# Patient Record
Sex: FEMALE | Race: WHITE
[De-identification: names, ages, dates, MRNs, and addresses within clinical notes are randomized per-mention and may not be internally consistent; named-entity substitution may affect disease eponyms.]

---

## 2018-06-14 ENCOUNTER — HOSPITAL ENCOUNTER (OUTPATIENT)
Dept: HOSPITAL 47 - RADBDWWP | Age: 78
Discharge: HOME | End: 2018-06-14
Payer: MEDICARE

## 2018-06-14 DIAGNOSIS — Z78.0: Primary | ICD-10-CM

## 2018-06-14 PROCEDURE — 77080 DXA BONE DENSITY AXIAL: CPT

## 2018-06-14 NOTE — BD
EXAMINATION TYPE: Axial Bone Density

 

DATE OF EXAM: 2018

 

COMPARISON: NONE

 

CLINICAL HISTORY: 77 YR OLD FEMALE....ICD-10 CODE:  Z78.0 POST MENOPAUSAL STATE W/O HRT

 

 

Height:  59.3

Weight:  138

 

FRAX RISK QUESTIONS:

  Current Tobacco Use: QUIT 22 YRS AGO

STEROIDS 

 

RISK FACTORS 

HISTORY OF: 

Family History of Osteoporosis: YES, SISTER AND MOTHER, NO FX HIPS

Active: BEST SHE CA, LUNG PROBLEMS

Diet low in dairy products/other sources of calcium:  NO

Postmenopausal woman: YES, IN LATE 40'S

Take estrogen and/or progesterone medications: YES, PREMARIN AND PROVERA

How lon YRS

Lost more than 2 inches in height since high school: YES

 

MEDICATIONS: 

Prednisone or other steroids: UPDRAFT MACHINE WITH ALBUTEROL FOR LUNGS

How Long: FOR 3 YRS

Thyroid Medications:  YES, SYNTHROID, 20-30 YRS

Additional Medications: HX OF RADIATION AND CHEMO, REFLUX MEDS,    

Additional History: LUNG CA, 2015,  

 

EXAM MEASUREMENTS: 

Bone mineral densitometry was performed using the [employer] System.

Bone mineral density as measured about the Lumbar spine is:  

----- L1-L4(G/cm2): 1.167

T Score Values are as follows:

----- L1: -0.3

----- L2:  -0.1

----- L3:  0.3

----- L4:  -0.4

----- L1-L4:  -0.1

Bone mineral density     FIRST BONE DENSITY STUDY AT [facility]

 

Bone mineral density about the R hip (g/cm2): 0.971

Bone mineral density about the L hip (g/cm2):  1.017

T Score values are as follows:

-----R Neck:  -1.0

-----L Neck:  -0.4

-----R Total:  -0.3

-----L Total:  0.1

Bone mineral density    FIRST STUDY AT [facility] 

 

FRAX%s:    THERE IS A 17.1% CHANCE OF A MAJOR OSTEOPOROTIC FX AND A 3.7% FOR HIP FX.....PROBABILITY O
F FX

IN 10 YRS TIME

 

IMPRESSION:

Normal (Values between +1 and -1 indicate normal bone mass).  Consider repeating this study in 5 year
s or sooner if there is some new clinical indication.

 

NOTE:  T-SCORE=SD OF THE YOUNG ADULT MEAN.

## 2020-06-15 ENCOUNTER — HOSPITAL ENCOUNTER (EMERGENCY)
Dept: HOSPITAL 47 - EC | Age: 80
Discharge: HOME | End: 2020-06-15
Payer: MEDICARE

## 2020-06-15 VITALS — DIASTOLIC BLOOD PRESSURE: 87 MMHG | SYSTOLIC BLOOD PRESSURE: 160 MMHG | HEART RATE: 70 BPM

## 2020-06-15 VITALS — TEMPERATURE: 98.2 F | RESPIRATION RATE: 18 BRPM

## 2020-06-15 DIAGNOSIS — Z79.890: ICD-10-CM

## 2020-06-15 DIAGNOSIS — Z85.118: ICD-10-CM

## 2020-06-15 DIAGNOSIS — Z79.899: ICD-10-CM
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
## 2020-06-15 DIAGNOSIS — K21.9: ICD-10-CM

## 2020-06-15 DIAGNOSIS — S01.01XA: Primary | ICD-10-CM

## 2020-06-15 DIAGNOSIS — E07.9: ICD-10-CM

## 2020-06-15 DIAGNOSIS — Z87.891: ICD-10-CM

## 2020-06-15 PROCEDURE — 99283 EMERGENCY DEPT VISIT LOW MDM: CPT

## 2020-06-15 PROCEDURE — 12002 RPR S/N/AX/GEN/TRNK2.6-7.5CM: CPT

## 2020-06-15 PROCEDURE — 72125 CT NECK SPINE W/O DYE: CPT

## 2020-06-15 PROCEDURE — 70450 CT HEAD/BRAIN W/O DYE: CPT

## 2020-06-15 NOTE — CT
EXAMINATION TYPE: CT brain cspine wo con

 

DATE OF EXAM: 6/15/2020

 

COMPARISON: Chest CT Kena 15, 2015. Two-view chest x-ray January 3, 2016.

 

HISTORY: Fall, head laceration injury with headache and neck pain. History of lung cancer.

 

CT DLP: 1255.9 mGycm. Automated Exposure Control for Dose Reduction was Utilized.

 

 

TECHNIQUE: CT scan of the head and cervical spine are performed without contrast.

 

FINDINGS:   There is no acute intracranial hemorrhage or midline shift identified. Diffuse ventricula
r and sulcal prominence consistent with mild-to-moderate generalized atrophy. Low-attenuation in the 
deep white matter consistent with mild-to-moderate chronic small vessel ischemic change. The calvariu
m is intact. The globes are intact and the visualized sinuses are clear. Nasal septum deviated to rig
ht of midline.

 

Cervical spine is visualized in its entirety from C1 through upper thoracic levels and demonstrates s
atisfactory alignment without evidence of acute fracture or dislocation.  Prevertebral soft tissue ap
pears within normal limits.  The C1-C2 articulation is within normal limits on the coronal images.  V
ertebral body heights are maintained. Moderate to severe disc space narrowing and spurring C6-C7 leve
l. Mild to moderate anterior spurring C7-T1 level and C5-C6 level. 

 

Posterior disc herniations efface the anterior thecal sac at C5-C6 spur disc complex at C6-C7 level o
n axial images. Mild calcified plaque left carotid bulb level is present. Thyroid gland is small in s
ize or atrophic. Left lung apex shows increasing calcification and moderate pleural/parenchymal scarr
ing. There is fluid and soft tissue filling the right lung apex more prominent from 2015 and 2016 addi
dies. Correlate clinically.

 

IMPRESSION:

1. There is no acute fracture or dislocation evident in the cervical spine.

2. No acute intracranial hemorrhage or midline shift is seen.

 

Other findings as noted above.

## 2020-06-15 NOTE — ED
Head Injury HPI





- General


Chief complaint: Head Injury


Stated complaint: fall, head lac


Time Seen by Provider: 06/15/20 10:24


Source: patient


Mode of arrival: ambulatory


Limitations: no limitations





- History of Present Illness


Initial comments: 


79-year-old female presenting today for chief complaint of fall.  Patient states

that she was on a stool one step up attempting to grab something off of a shelf 

when she had her hand slipped and she fell backwards onto her bottom.  Patient 

states she has no symptoms back pain very mild.  She states she fell backwards 

and hit her head after she landed on her butt striking an object and was more 

concerned about the laceration.  She states her head was bleeding and cannot see

the area.  Patient states her tetanus up-to-date.  She denies any headache 

nausea vomiting visual changes weakness of the upper or lower extremities or 

sensation deficits of the upper or lower extremities she states she is able to 

walk without difficulty denies any hip or pelvic pain denies abdominal pain 

chest pain or pain with deep inspiration.  Patient is no additional complaints 

denies any use of anticoagulation therapy or loss of consciousness.  Patient 

appears on arrival her distress.  Bleeding controlled








- Related Data


                                Home Medications











 Medication  Instructions  Recorded  Confirmed


 


Levothyroxine Sodium [Synthroid] 137 mcg PO DAILY 03/28/15 06/15/20


 


Omeprazole [PriLOSEC] 20 mg PO BID 05/13/15 06/15/20


 


Estrogens, Conjugated [Premarin] 0.3 mg PO Q5D 11/23/15 06/15/20


 


Medroxyprogesterone Acetate 1.25 mg PO Q5D 11/23/15 06/15/20





[Provera]   


 


Cholecalciferol [Vitamin D3 (25 1,000 unit PO DAILY 06/15/20 06/15/20





Mcg = 1000 Iu)]   


 


Clobetasol Propionate/Emoll 1 applic TOPICAL DAILY PRN 06/15/20 06/15/20





[Clobetasol Emulsion 0.05% Foam]   











Allergies/Adverse reactions: 


                                    Allergies











Allergy/AdvReac Type Severity Reaction Status Date / Time


 


No Known Allergies Allergy   Verified 06/15/20 10:35














Review of Systems


ROS Statement: 


Those systems with pertinent positive or pertinent negative responses have been 

documented in the HPI.





ROS Other: All systems not noted in ROS Statement are negative.





Past Medical History


Past Medical History: Coronary Artery Disease (CAD), Cancer, COPD, GERD/Reflux, 

Hyperlipidemia, Osteoarthritis (OA), Pneumonia, Thyroid Disorder


Additional Past Medical History / Comment(s): stage IIB small cell lung cancer, 

bladder atony, had chemo last one 4/19/15, finished radiation 2015


History of Any Multi-Drug Resistant Organisms: None Reported


Past Surgical History: Adenoidectomy, Cholecystectomy, Tonsillectomy


Additional Past Surgical History / Comment(s): cataract surgery, transbronchial 

biopsy, VATS, port A- cath, colonoscopy. has mediport on right side of chest-

REMOVED 2015, removal of RUL and removal of lymph nodes right side 5/27/15


Past Anesthesia/Blood Transfusion Reactions: No Reported Reaction


Past Psychological History: No Psychological Hx Reported


Smoking Status: Former smoker


Past Alcohol Use History: Occasional


Past Drug Use History: None Reported





- Past Family History


  ** Mother


Family Medical History: Coronary Artery Disease (CAD), CVA/TIA, Myocardial 

Infarction (MI)


Additional Family Medical History / Comment(s): Mother  of heart attack at 

age 84





  ** Father


Family Medical History: Coronary Artery Disease (CAD), Myocardial Infarction 

(MI)


Additional Family Medical History / Comment(s): Father  of heart attack at 

age 86





  ** Brother(s)


Family Medical History: Myocardial Infarction (MI)


Additional Family Medical History / Comment(s): Brother  of heart attack at 

age 51





  ** Sister(s)


Family Medical History: Myocardial Infarction (MI)


Additional Family Medical History / Comment(s): Aunt with leukemia, maternal 

aunt with breast cancer





General Exam





- General Exam Comments


Initial Comments: 


General:  The patient is awake and alert, in no distress, and does not appear 

acutely ill. 


Eye:  +3 mm pupils are equal, round and reactive to light, extra-ocular 

movements are intact.  No nystagmus.  There is normal conjunctiva bilaterally.  

No signs of icterus.  


Ears, nose, mouth and throat:  There are moist mucous membranes and no oral 

lesions.  No raccoon or Ahumada sign no noted hematomas or abrasions of the 

parietal frontal temporal regions of the scalp there is a small hematoma of the 

occiptal region with what appears to be 2.5cm laceration horizontal lie  left-

sided, no bleeding


Neck:  The neck is supple, there is no tenderness or JVD.  No midline tenderness

to palpation the cervical spine full range of motion of cervical spine without 

pain.


Cardiovascular:  There is a regular rate and rhythm. No murmur, rub or gallop is

appreciated.


Respiratory:  Lungs are clear to auscultation, respirations are non-labored, 

breath sounds are equal.  No wheezes, stridor, rales, or rhonchi.


Gastrointestinal:  Soft, non-distended, non-tender abdomen without masses or 

organomegaly noted. There is no rebound or guarding present. 


Musculoskeletal:  No midline tenderness or pain to palpation of lumbar spine. 

Normal ROM, no tenderness of the LE b/l.  Strength 5/5 of the LE b/l. Sensation 

intact of the LE b/l. Radial pulses equal bilaterally 2+.  


Neurological:  A&O x 3. CN II-XII intact, There are no obvious motor or sensory 

deficits. Coordination appears grossly intact. Speech is normal.


Skin:  Skin is warm and dry and no rashes


Psychiatric:  Cooperative, appropriate mood & affect, normal judgment.  





Limitations: no limitations





Course


                                   Vital Signs











  06/15/20 06/15/20





  10:35 11:45


 


Temperature 98.2 F 


 


Pulse Rate 97 70


 


Respiratory 18 18





Rate  


 


Blood Pressure 167/87 160/87


 


O2 Sat by Pulse 97 98





Oximetry  














Procedures





- Laceration


  ** Laceration #1


Consent Obtained: verbal consent


Indication: laceration


Site: scalp


Size (cm): 3 (2.5 actual)


Description: linear


Depth: simple, single layer


Pre-repair: wound explored, deep structures intact


Type of Sutures: other (staple)


Size of Sutures: other (staple)


Number of Sutures: 5


Complications: pain


Patient Tolerated Procedure: well





Medical Decision Making





- Medical Decision Making


79-year-old female presenting today for follow-up.  On initial inspection there.

 The abrasion however on further evaluation after cleansing there was a 2.5 cm 

laceration repaired with staples.  Patient no midline neck pain no back pain to 

palpation on examination she states that she is really not concerned with the 

back more so the head.  Neurovascularly patient was intact. CT (-) for acute 

process. HX of lung cancer, some apices changes on CT picked up. recommended 

outpatient f/u for incidental findings including the atherosclerosis of the 

carotid bulbs. Patient is agreeable to discharge with return for staple removal 

in 10 days which was discussed, they know they are to return to ER for removal 

or PCP. Patient discharged appearing well. Dr. Contreras agreeable to care plan.








Disposition


Clinical Impression: 


 Head injury, Fall, Scalp abrasion, Hematoma, Laceration





Disposition: HOME SELF-CARE


Condition: Good


Instructions (If sedation given, give patient instructions):  Abrasion (ED), 

Staple Care (ED)


Additional Instructions: 


Please use medication as discussed.  Please follow-up with family doctor in the 

next 2 days, recommend obtaining the CT report to go over incidental findings 

and scheduling appropriate outpatient follow-up/studies.  Please return to 

emergency room if the symptoms increase or worsen or for any other concerns.


Is patient prescribed a controlled substance at d/c from ED?: No


Referrals: 


Junior Fairchild MD [Primary Care Provider] - 1-2 days


Time of Disposition: 11:13

## 2022-12-30 ENCOUNTER — HOSPITAL ENCOUNTER (OUTPATIENT)
Dept: HOSPITAL 47 - RADCTMAIN | Age: 82
Discharge: HOME | End: 2022-12-30
Attending: INTERNAL MEDICINE
Payer: MEDICARE

## 2022-12-30 DIAGNOSIS — J43.9: Primary | ICD-10-CM

## 2022-12-30 DIAGNOSIS — R91.1: ICD-10-CM

## 2022-12-30 LAB — BUN SERPL-SCNC: 15 MG/DL (ref 7–17)

## 2022-12-30 PROCEDURE — 84520 ASSAY OF UREA NITROGEN: CPT

## 2022-12-30 PROCEDURE — 71260 CT THORAX DX C+: CPT

## 2022-12-30 PROCEDURE — 36415 COLL VENOUS BLD VENIPUNCTURE: CPT

## 2022-12-30 PROCEDURE — 82565 ASSAY OF CREATININE: CPT

## 2022-12-30 NOTE — CT
Exam: CT Chest with contrast.

 

Date: 12/30/2022. 

 

Comparison: 6/15/2015.

 

History: History of lung cancer with chronic cough.

 

Technique: CT examination of the chest was performed following the intravenous administration of 70 m
L of Isovue-300.  Coronal and sagittal reformats were performed. CT dose lowering techniques were use
d, to include: automated exposure control, adjustment for patient size, and/or use of iterative recon
struction.

 

FINDINGS:

 

Mediastinum and Lian: There is no axillary, mediastinal or hilar lymphadenopathy.

 

Pleural and Pericardial spaces: There are no pleural or pericardial effusions.

 

Upper Abdomen: There is an unchanged small cyst within the left lobe of the liver. The gallbladder silva
rgically absent. The visualized upper abdomen otherwise appears unremarkable.

 

Cardiovascular: Is mild vascular calcination within the thoracic aorta without evidence of aneurysmal
 dilation or dissection. There are mild patchy coronary artery calcifications.

 

Pulmonary Artery: There are no central pulmonary arterial abnormalities.  The examination was not per
formed to evaluate for pulmonary embolism.

 

Lung Parenchyma and Airways: There is some consolidative changes seen within the right apical region 
likely postsurgical changes as well as some suture lines as this is likely related to surgical change
s and scarring. Residual disease would be difficult to entirely exclude. There are some linear bands 
of opacities otherwise seen within the right lung which likely related to scarring. There is some mil
d centrilobular emphysema seen throughout the left lung. There is a 1.6 cm nodule in the superior seg
ment of the left lower lobe abutting the pleural surface on series 4 image 19 which was not clearly p
resent on the previous examination that was available for comparison. The lungs otherwise appear fiona
r

 

Bones: Multiple rib abnormalities on the right side are likely related to prior surgical changes. No 
aggressive osseous lesions are seen.

 

IMPRESSION:

 

1. Surgical changes of right upper lobectomy as well as likely scarring and soft tissue density withi
n the right upper lobe along with some consolidative changes. Residual disease in this location very 
difficult to entirely exclude.

2. Irregular appearing nodule with the superior segment of the left lower lobe is of indeterminate et
iology. Malignancy and metastasis would be difficult to exclude. PET/CT would BE recommended for furt
her evaluation if this has not previously been worked up ORIF there are no more recent prior examinat
ions for comparison. The prior for comparison available is dated 6/15/2015.

3. Mild emphysema.

## 2023-07-18 ENCOUNTER — HOSPITAL ENCOUNTER (INPATIENT)
Dept: HOSPITAL 47 - EC | Age: 83
LOS: 1 days | Discharge: HOME | DRG: 192 | End: 2023-07-19
Payer: MEDICARE

## 2023-07-18 DIAGNOSIS — Z92.3: ICD-10-CM

## 2023-07-18 DIAGNOSIS — Z92.21: ICD-10-CM

## 2023-07-18 DIAGNOSIS — K21.9: ICD-10-CM

## 2023-07-18 DIAGNOSIS — Z85.118: ICD-10-CM

## 2023-07-18 DIAGNOSIS — I25.10: ICD-10-CM

## 2023-07-18 DIAGNOSIS — R79.89: ICD-10-CM

## 2023-07-18 DIAGNOSIS — N31.2: ICD-10-CM

## 2023-07-18 DIAGNOSIS — Z79.899: ICD-10-CM

## 2023-07-18 DIAGNOSIS — E03.9: ICD-10-CM

## 2023-07-18 DIAGNOSIS — Z90.2: ICD-10-CM

## 2023-07-18 DIAGNOSIS — E78.5: ICD-10-CM

## 2023-07-18 DIAGNOSIS — Z87.01: ICD-10-CM

## 2023-07-18 DIAGNOSIS — J44.1: Primary | ICD-10-CM

## 2023-07-18 DIAGNOSIS — Z82.49: ICD-10-CM

## 2023-07-18 DIAGNOSIS — Z79.890: ICD-10-CM

## 2023-07-18 DIAGNOSIS — M19.90: ICD-10-CM

## 2023-07-18 LAB
ALBUMIN SERPL-MCNC: 3.9 G/DL (ref 3.5–5)
ALP SERPL-CCNC: 63 U/L (ref 38–126)
ALT SERPL-CCNC: 17 U/L (ref 4–34)
ANION GAP SERPL CALC-SCNC: 9 MMOL/L
APTT BLD: 23.6 SEC (ref 22–30)
AST SERPL-CCNC: 37 U/L (ref 14–36)
BUN SERPL-SCNC: 14 MG/DL (ref 7–17)
CALCIUM SPEC-MCNC: 9.5 MG/DL (ref 8.4–10.2)
CELLS COUNTED: 100
CHLORIDE SERPL-SCNC: 104 MMOL/L (ref 98–107)
CO2 SERPL-SCNC: 23 MMOL/L (ref 22–30)
EOSINOPHIL # BLD MANUAL: 0.26 K/UL (ref 0–0.7)
ERYTHROCYTE [DISTWIDTH] IN BLOOD BY AUTOMATED COUNT: 4.54 M/UL (ref 3.8–5.4)
ERYTHROCYTE [DISTWIDTH] IN BLOOD: 13.1 % (ref 11.5–15.5)
GLUCOSE SERPL-MCNC: 133 MG/DL (ref 74–99)
HCT VFR BLD AUTO: 39.1 % (ref 34–46)
HGB BLD-MCNC: 13.3 GM/DL (ref 11.4–16)
INR PPP: 1 (ref ?–1.2)
LYMPHOCYTES # BLD MANUAL: 2.08 K/UL (ref 1–4.8)
MAGNESIUM SPEC-SCNC: 1.8 MG/DL (ref 1.6–2.3)
MCH RBC QN AUTO: 29.4 PG (ref 25–35)
MCHC RBC AUTO-ENTMCNC: 34.1 G/DL (ref 31–37)
MCV RBC AUTO: 86.1 FL (ref 80–100)
MONOCYTES # BLD MANUAL: 0.65 K/UL (ref 0–1)
NEUTROPHILS NFR BLD MANUAL: 54 %
NEUTS SEG # BLD MANUAL: 3.51 K/UL (ref 1.3–7.7)
PLATELET # BLD AUTO: 242 K/UL (ref 150–450)
POTASSIUM SERPL-SCNC: 4 MMOL/L (ref 3.5–5.1)
PROT SERPL-MCNC: 7.1 G/DL (ref 6.3–8.2)
PT BLD: 10.2 SEC (ref 9–12)
SODIUM SERPL-SCNC: 136 MMOL/L (ref 137–145)
WBC # BLD AUTO: 6.5 K/UL (ref 3.8–10.6)

## 2023-07-18 PROCEDURE — 84145 PROCALCITONIN (PCT): CPT

## 2023-07-18 PROCEDURE — 83880 ASSAY OF NATRIURETIC PEPTIDE: CPT

## 2023-07-18 PROCEDURE — 85025 COMPLETE CBC W/AUTO DIFF WBC: CPT

## 2023-07-18 PROCEDURE — 85610 PROTHROMBIN TIME: CPT

## 2023-07-18 PROCEDURE — 87040 BLOOD CULTURE FOR BACTERIA: CPT

## 2023-07-18 PROCEDURE — 94760 N-INVAS EAR/PLS OXIMETRY 1: CPT

## 2023-07-18 PROCEDURE — 96374 THER/PROPH/DIAG INJ IV PUSH: CPT

## 2023-07-18 PROCEDURE — 87449 NOS EACH ORGANISM AG IA: CPT

## 2023-07-18 PROCEDURE — 84484 ASSAY OF TROPONIN QUANT: CPT

## 2023-07-18 PROCEDURE — 83605 ASSAY OF LACTIC ACID: CPT

## 2023-07-18 PROCEDURE — 87205 SMEAR GRAM STAIN: CPT

## 2023-07-18 PROCEDURE — 71045 X-RAY EXAM CHEST 1 VIEW: CPT

## 2023-07-18 PROCEDURE — 83735 ASSAY OF MAGNESIUM: CPT

## 2023-07-18 PROCEDURE — 80053 COMPREHEN METABOLIC PANEL: CPT

## 2023-07-18 PROCEDURE — 93005 ELECTROCARDIOGRAM TRACING: CPT

## 2023-07-18 PROCEDURE — 99285 EMERGENCY DEPT VISIT HI MDM: CPT

## 2023-07-18 PROCEDURE — 85379 FIBRIN DEGRADATION QUANT: CPT

## 2023-07-18 PROCEDURE — 80048 BASIC METABOLIC PNL TOTAL CA: CPT

## 2023-07-18 PROCEDURE — 94640 AIRWAY INHALATION TREATMENT: CPT

## 2023-07-18 PROCEDURE — 85730 THROMBOPLASTIN TIME PARTIAL: CPT

## 2023-07-18 PROCEDURE — 87070 CULTURE OTHR SPECIMN AEROBIC: CPT

## 2023-07-18 PROCEDURE — 36415 COLL VENOUS BLD VENIPUNCTURE: CPT

## 2023-07-18 RX ADMIN — IPRATROPIUM BROMIDE AND ALBUTEROL SULFATE SCH ML: .5; 3 SOLUTION RESPIRATORY (INHALATION) at 22:45

## 2023-07-18 NOTE — ED
General Adult HPI





- General


Chief complaint: Shortness of Breath


Stated complaint: VIGNESH


Time Seen by Provider: 23 13:40


Source: patient, family, RN notes reviewed


Mode of arrival: wheelchair


Limitations: no limitations





- History of Present Illness


Initial comments: 





Patient is a pleasant 82-year-old female presenting to the emergency department 

with concerns with difficulty breathing.  Patient does have history of lung 

cancer with some chronic mild dyspnea.  Patient uses oxygen at night.  Patient 

states she started with increased dyspnea and wheezing the last couple hours.  

No chest pain.  No cough.  No fever.  Patient rarely wheezes and questions if 

she has a COPD history.  No leg pain or swelling.





- Related Data


                                Home Medications











 Medication  Instructions  Recorded  Confirmed


 


Omeprazole [PriLOSEC] 20 mg PO BID 05/13/15 07/18/23


 


Estrogens, Conjugated [Premarin] 0.3 mg PO Q72H 11/23/15 07/18/23


 


Medroxyprogesterone Acetate 1.25 mg PO Q72H 11/23/15 07/18/23





[Provera]   


 


Clobetasol Propionate/Emoll 1 applic TOPICAL HS 06/15/20 07/18/23





[Clobetasol Emulsion 0.05% Foam]   


 


Levothyroxine Sodium [Synthroid] 50 mcg PO SUSA 23


 


Levothyroxine Sodium [Synthroid] 100 mcg PO MOTUWETHFR 23


 


Multivitamins, Thera [Multivitamin 1 tab PO DAILY 23





(formulary)]   


 


Rosuvastatin Calcium 5 mg PO DAILY 23











                                    Allergies











Allergy/AdvReac Type Severity Reaction Status Date / Time


 


No Known Allergies Allergy   Verified 23 15:21














Review of Systems


ROS Statement: 


Those systems with pertinent positive or pertinent negative responses have been 

documented in the HPI.





ROS Other: All systems not noted in ROS Statement are negative.


Constitutional: Denies: fever


Eyes: Denies: eye pain


ENT: Denies: ear pain


Respiratory: Reports: as per HPI, dyspnea, wheezes


Cardiovascular: Denies: chest pain


Endocrine: Denies: fatigue


Gastrointestinal: Denies: abdominal pain


Genitourinary: Denies: dysuria





Past Medical History


Past Medical History: Coronary Artery Disease (CAD), Cancer, COPD, GERD/Reflux, 

Hyperlipidemia, Osteoarthritis (OA), Pneumonia, Thyroid Disorder


Additional Past Medical History / Comment(s): stage IIB small cell lung cancer, 

bladder atony, had chemo last one 4/19/15, finished radiation 2015


History of Any Multi-Drug Resistant Organisms: None Reported


Past Surgical History: Adenoidectomy, Cholecystectomy, Tonsillectomy


Additional Past Surgical History / Comment(s): cataract surgery, transbronchial 

biopsy, VATS, port A- cath, colonoscopy. has mediport on right side of chest-

REMOVED 2015, removal of RUL and removal of lymph nodes right side 5/27/15


Past Anesthesia/Blood Transfusion Reactions: No Reported Reaction


Past Psychological History: No Psychological Hx Reported


Past Alcohol Use History: Occasional


Past Drug Use History: None Reported





- Past Family History


  ** Mother


Family Medical History: Coronary Artery Disease (CAD), CVA/TIA, Myocardial 

Infarction (MI)


Additional Family Medical History / Comment(s): Mother  of heart attack at 

age 84





  ** Father


Family Medical History: Coronary Artery Disease (CAD), Myocardial Infarction 

(MI)


Additional Family Medical History / Comment(s): Father  of heart attack at 

age 86





  ** Brother(s)


Family Medical History: Myocardial Infarction (MI)


Additional Family Medical History / Comment(s): Brother  of heart attack at 

age 51





  ** Sister(s)


Family Medical History: Myocardial Infarction (MI)


Additional Family Medical History / Comment(s): Aunt with leukemia, maternal 

aunt with breast cancer





General Exam


Limitations: no limitations


General appearance: alert, in no apparent distress


Head exam: Present: normocephalic


Eye exam: Present: normal appearance


Neck exam: Present: normal inspection


Respiratory exam: Present: wheezes, decreased breath sounds


Cardiovascular Exam: Present: regular rate, normal rhythm


GI/Abdominal exam: Present: soft.  Absent: tenderness


Extremities exam: Present: normal inspection.  Absent: pedal edema, calf ten

derness


Neurological exam: Present: alert


Psychiatric exam: Present: normal affect, normal mood


Skin exam: Present: normal color





Course


                                   Vital Signs











  23





  13:30 14:12 14:17


 


Temperature 98.4 F  


 


Pulse Rate 83 74 76


 


Respiratory 36 H  





Rate   


 


Blood Pressure 158/69  


 


O2 Sat by Pulse 100  





Oximetry   














EKG Findings





- EKG Results:


EKG: interpreted by ERMD, sinus rhythm, normal axis, normal QRS, normal ST/T





Medical Decision Making





- Medical Decision Making





Was pt. sent in by a medical professional or institution (, PA, NP, urgent 

care, hospital, or nursing home...) When possible be specific


@  -No


Did you speak to anyone other than the patient for history (EMS, parent, family,

police, friend...)? What history was obtained from this source 


@  -No


Did you review nursing and triage notes (agree or disagree)?  Why? 


@  -I reviewed and agree with nursing and triage notes


Were old charts reviewed (outside hosp., previous admission, EMS record, old 

EKG, old radiological studies, urgent care reports/EKG's, nursing home records)?

Report findings 


@  -No old charts were reviewed


Differential Diagnosis (chest pain, altered mental status, abdominal pain women,

abdominal pain men, vaginal bleeding, weakness, fever, dyspnea, syncope, 

headache, dizziness, GI bleed, back pain, seizure, CVA, palpatations, mental 

health, musculoskeletal)? 


@  -Differential Dyspnea:


Coronary syndrome, arrhythmia, tamponade, asthma, COPD, pulmonary embolism, 

pneumonia, pneumothorax, pulmonary effusion, anaphylaxis, diabetic ketoacidosis,

flailed chest, pulmonary contusion, diaphragmatic rupture, anemia, 

neuromuscular, this is not meant to be an all-inclusive list. 


EKG interpreted by me (3pts min.).


@  -As above


X-rays interpreted by me (1pt min.).


@  -Chest x-ray shows right upper lobe opacity versus atelectasis


CT interpreted by me (1pt min.).


@  -None done


U/S interpreted by me (1pt. min.).


@  -None done


What testing was considered but not performed or refused? (CT, X-rays, U/S, 

labs)? Why?


@  -None


What meds were considered but not given or refused? Why?


@  -None


Did you discuss the management of the patient with other professionals 

(professionals i.e. SUKHI Henning, NP, lab, RT, psych nurse, , , 

teacher, , )? Give summary


@  -Case was discussed with Dr. Laurent, who will admit For Dr. Fairchild.


Was smoking cessation discussed for >3mins.?


@  -No


Was critical care preformed (if so, how long)?


@  -No


Were there social determinants of health that impacted care today? How? 

(Homelessness, low income, unemployed, alcoholism, drug addiction, arroyo

sportation, low edu. Level, literacy, decrease access to med. care, senior care, 

rehab)?


@  -No


Was there de-escalation of care discussed even if they declined (Discuss DNR or 

withdrawal of care, Hospice)? DNR status


@  -No


What co-morbidities impacted this encounter? (DM, HTN, Smoking, COPD, CAD, 

Cancer, CVA, ARF, Chemo, Hep., AIDS, mental health diagnosis, sleep apnea, 

morbid obesity)?


@  -None


Was patient admitted / discharged? Hospital course, mention meds given and 

route, prescriptions, significant lab abnormalities, going to OR and other 

pertinent info.


@  -Patient reevaluated and is somewhat improved.  Patient is feeling much 

better.  Patient did present with respiratory distress and has history of lung 

cancer with concern for pneumonia.  Patient will be admitted.  Patient states 

she sees Dr. Goodman from pulmonary.  Patient will be admitted.  Orders written. 


Undiagnosed new problem with uncertain prognosis?


@  -No


Drug Therapy requiring intensive monitoring for toxicity (Heparin, Nitro, 

Insulin, Cardizem)?


@  -No


Were any procedures done?


@  -No


Diagnosis/symptom?


@  -Pneumonia


Acute, or Chronic, or Acute on Chronic?


@  -Acute


Uncomplicated (without systemic symptoms) or Complicated (systemic symptoms)?


@  -default


Side effects of treatment?


@  -No


Exacerbation, Progression, or Severe Exacerbation?


@  -No


Poses a threat to life or bodily function? How? (Chest pain, USA, MI, pneumonia,

PE, COPD, DKA, ARF, appy, cholecystitis, CVA, Diverticulitis, Homicidal, 

Suicidal, threat to staff... and all critical care pts)


@  -No





- Lab Data


Result diagrams: 


                                 23 13:52





                                 23 13:52


                                   Lab Results











  23 Range/Units





  13:52 13:52 13:52 


 


WBC  6.5    (3.8-10.6)  k/uL


 


RBC  4.54    (3.80-5.40)  m/uL


 


Hgb  13.3    (11.4-16.0)  gm/dL


 


Hct  39.1    (34.0-46.0)  %


 


MCV  86.1    (80.0-100.0)  fL


 


MCH  29.4    (25.0-35.0)  pg


 


MCHC  34.1    (31.0-37.0)  g/dL


 


RDW  13.1    (11.5-15.5)  %


 


Plt Count  242    (150-450)  k/uL


 


MPV  8.1    


 


Neutrophils % (Manual)  54    %


 


Lymphocytes % (Manual)  32    %


 


Monocytes % (Manual)  10    %


 


Eosinophils % (Manual)  4    %


 


Neutrophils # (Manual)  3.51    (1.3-7.7)  k/uL


 


Lymphocytes # (Manual)  2.08    (1.0-4.8)  k/uL


 


Monocytes # (Manual)  0.65    (0-1.0)  k/uL


 


Eosinophils # (Manual)  0.26    (0-0.7)  k/uL


 


Nucleated RBCs  0    (0-0)  /100 WBC


 


Manual Slide Review  Performed    


 


Reactive Lymphocytes  Present    


 


RBC Morphology  Normal    


 


PT   10.2   (9.0-12.0)  sec


 


INR   1.0   (<1.2)  


 


APTT   23.6   (22.0-30.0)  sec


 


D-Dimer   0.67 H   (<0.60)  mg/L FEU


 


Sodium    136 L  (137-145)  mmol/L


 


Potassium    4.0  (3.5-5.1)  mmol/L


 


Chloride    104  ()  mmol/L


 


Carbon Dioxide    23  (22-30)  mmol/L


 


Anion Gap    9  mmol/L


 


BUN    14  (7-17)  mg/dL


 


Creatinine    0.66  (0.52-1.04)  mg/dL


 


Est GFR (CKD-EPI)AfAm    >90  (>60 ml/min/1.73 sqM)  


 


Est GFR (CKD-EPI)NonAf    82  (>60 ml/min/1.73 sqM)  


 


Glucose    133 H  (74-99)  mg/dL


 


Plasma Lactic Acid Christopher     (0.7-2.0)  mmol/L


 


Calcium    9.5  (8.4-10.2)  mg/dL


 


Magnesium    1.8  (1.6-2.3)  mg/dL


 


Total Bilirubin    0.6  (0.2-1.3)  mg/dL


 


AST    37 H  (14-36)  U/L


 


ALT    17  (4-34)  U/L


 


Alkaline Phosphatase    63  ()  U/L


 


Troponin I     (0.000-0.034)  ng/mL


 


NT-Pro-B Natriuret Pep     pg/mL


 


Total Protein    7.1  (6.3-8.2)  g/dL


 


Albumin    3.9  (3.5-5.0)  g/dL














  23 Range/Units





  13:52 13:52 13:52 


 


WBC     (3.8-10.6)  k/uL


 


RBC     (3.80-5.40)  m/uL


 


Hgb     (11.4-16.0)  gm/dL


 


Hct     (34.0-46.0)  %


 


MCV     (80.0-100.0)  fL


 


MCH     (25.0-35.0)  pg


 


MCHC     (31.0-37.0)  g/dL


 


RDW     (11.5-15.5)  %


 


Plt Count     (150-450)  k/uL


 


MPV     


 


Neutrophils % (Manual)     %


 


Lymphocytes % (Manual)     %


 


Monocytes % (Manual)     %


 


Eosinophils % (Manual)     %


 


Neutrophils # (Manual)     (1.3-7.7)  k/uL


 


Lymphocytes # (Manual)     (1.0-4.8)  k/uL


 


Monocytes # (Manual)     (0-1.0)  k/uL


 


Eosinophils # (Manual)     (0-0.7)  k/uL


 


Nucleated RBCs     (0-0)  /100 WBC


 


Manual Slide Review     


 


Reactive Lymphocytes     


 


RBC Morphology     


 


PT     (9.0-12.0)  sec


 


INR     (<1.2)  


 


APTT     (22.0-30.0)  sec


 


D-Dimer     (<0.60)  mg/L FEU


 


Sodium     (137-145)  mmol/L


 


Potassium     (3.5-5.1)  mmol/L


 


Chloride     ()  mmol/L


 


Carbon Dioxide     (22-30)  mmol/L


 


Anion Gap     mmol/L


 


BUN     (7-17)  mg/dL


 


Creatinine     (0.52-1.04)  mg/dL


 


Est GFR (CKD-EPI)AfAm     (>60 ml/min/1.73 sqM)  


 


Est GFR (CKD-EPI)NonAf     (>60 ml/min/1.73 sqM)  


 


Glucose     (74-99)  mg/dL


 


Plasma Lactic Acid Christopher  1.4    (0.7-2.0)  mmol/L


 


Calcium     (8.4-10.2)  mg/dL


 


Magnesium     (1.6-2.3)  mg/dL


 


Total Bilirubin     (0.2-1.3)  mg/dL


 


AST     (14-36)  U/L


 


ALT     (4-34)  U/L


 


Alkaline Phosphatase     ()  U/L


 


Troponin I   <0.012   (0.000-0.034)  ng/mL


 


NT-Pro-B Natriuret Pep    144  pg/mL


 


Total Protein     (6.3-8.2)  g/dL


 


Albumin     (3.5-5.0)  g/dL














Disposition


Clinical Impression: 


 Pneumonia





Disposition: ADMITTED AS IP TO THIS HOSP


Is patient prescribed a controlled substance at d/c from ED?: No


Referrals: 


Junior Fairchild MD [Primary Care Provider] - 1-2 days


Time of Disposition: 16:08

## 2023-07-18 NOTE — XR
EXAMINATION TYPE: XR chest 1V portable

 

DATE OF EXAM: 7/18/2023

 

COMPARISON: 1/3/2016, 12/30/2022 CT chest

 

INDICATION: Dyspnea

 

TECHNIQUE: Single frontal view of the chest is obtained.

 

FINDINGS:  

The heart size is normal.  

The pulmonary vasculature is normal.  

There is complete opacification of the right apex. There is deviation of the trachea towards the righ
t. Postsurgical changes are in the right suprahilar region. Small right pleural effusion may be prese
nt.  

 

 

IMPRESSION:  

1. Right apical opacification. Correlate for pneumonia or atelectasis. Right apical thickening and co
mplete opacification could be considered. Follow-up is recommended.

## 2023-07-19 VITALS — HEART RATE: 76 BPM

## 2023-07-19 VITALS — SYSTOLIC BLOOD PRESSURE: 129 MMHG | TEMPERATURE: 98 F | DIASTOLIC BLOOD PRESSURE: 78 MMHG | RESPIRATION RATE: 16 BRPM

## 2023-07-19 LAB
ANION GAP SERPL CALC-SCNC: 10.9 MMOL/L (ref 4–12)
BASOPHILS # BLD AUTO: 0.07 X 10*3/UL (ref 0–0.1)
BASOPHILS NFR BLD AUTO: 1.2 %
BUN SERPL-SCNC: 13.5 MG/DL (ref 9–27)
BUN/CREAT SERPL: 16.88 RATIO (ref 12–20)
CALCIUM SPEC-MCNC: 9.3 MG/DL (ref 8.7–10.3)
CHLORIDE SERPL-SCNC: 107 MMOL/L (ref 96–109)
CO2 SERPL-SCNC: 25.1 MMOL/L (ref 21.6–31.8)
EOSINOPHIL # BLD AUTO: 0.31 X 10*3/UL (ref 0.04–0.35)
EOSINOPHIL NFR BLD AUTO: 5.5 %
ERYTHROCYTE [DISTWIDTH] IN BLOOD BY AUTOMATED COUNT: 4.12 X 10*6/UL (ref 4.1–5.2)
ERYTHROCYTE [DISTWIDTH] IN BLOOD: 12.4 % (ref 11.5–14.5)
GLUCOSE SERPL-MCNC: 98 MG/DL (ref 70–110)
HCT VFR BLD AUTO: 36.4 % (ref 37.2–46.3)
HGB BLD-MCNC: 12.1 D/DL (ref 12–15)
IMM GRANULOCYTES BLD QL AUTO: 0.4 %
LYMPHOCYTES # SPEC AUTO: 1 X 10*3/UL (ref 0.9–5)
LYMPHOCYTES NFR SPEC AUTO: 17.7 %
MCH RBC QN AUTO: 29.4 PG (ref 27–32)
MCHC RBC AUTO-ENTMCNC: 33.2 D/DL (ref 32–37)
MCV RBC AUTO: 88.3 FL (ref 80–97)
MONOCYTES # BLD AUTO: 0.75 X 10*3/UL (ref 0.2–1)
MONOCYTES NFR BLD AUTO: 13.3 %
NEUTROPHILS # BLD AUTO: 3.5 X 10*3/UL (ref 1.8–7.7)
NEUTROPHILS NFR BLD AUTO: 61.9 %
NRBC BLD AUTO-RTO: 0 X 10*3/UL (ref 0–0.01)
PLATELET # BLD AUTO: 221 X 10*3/UL (ref 140–440)
POTASSIUM SERPL-SCNC: 4.5 MMOL/L (ref 3.5–5.5)
SODIUM SERPL-SCNC: 143 MMOL/L (ref 135–145)
WBC # BLD AUTO: 5.65 X 10*3/UL (ref 4.5–10)

## 2023-07-19 RX ADMIN — IPRATROPIUM BROMIDE AND ALBUTEROL SULFATE SCH ML: .5; 3 SOLUTION RESPIRATORY (INHALATION) at 11:59

## 2023-07-19 RX ADMIN — HEPARIN SODIUM SCH: 5000 INJECTION INTRAVENOUS; SUBCUTANEOUS at 16:10

## 2023-07-19 RX ADMIN — HEPARIN SODIUM SCH UNIT: 5000 INJECTION INTRAVENOUS; SUBCUTANEOUS at 09:26

## 2023-07-19 RX ADMIN — IPRATROPIUM BROMIDE AND ALBUTEROL SULFATE SCH ML: .5; 3 SOLUTION RESPIRATORY (INHALATION) at 08:47

## 2023-07-19 RX ADMIN — IPRATROPIUM BROMIDE AND ALBUTEROL SULFATE SCH ML: .5; 3 SOLUTION RESPIRATORY (INHALATION) at 15:56

## 2023-07-19 NOTE — P.CNPUL
History of Present Illness


Consult date: 23


Requesting physician: Daniel Quezada


Reason for consult: pneumonia


Chief complaint: shortness of breath and cough.


History of present illness: 





this is an 82-year-old female with history of non-small cell lung cancer, this 

was initially diagnosed on 2015.  PET scan at that time showed no evidence 

of metastasis, patient received neoadjuvant cis-platinum and Alimta with 

concurrent radiation treatment.  This was completed on 2015.  On 2015,

patient underwent right upper lobectomy, and she went on to develop chronic 

changes in the right upper lobe area.  Considering her diagnosis and the year it

was diagnosed, I believe the patient has done fairly well.  Patient normally 

sees Dr. TINO decker for her pulmonary issues.  She is on home O2, and she is 

oxygen as needed.  Her other medical problems include coronary artery disease, 

dyslipidemia, degenerative joint disease, and history of hypothyroidism.  

Patient presented to the ER with a few days' history of shortness of breath, co

ugh, describes the cough as productive with yellow phlegm, but no fever no 

chills no hemoptysis no chest pain.  Chest x-ray showed chronic changes in the 

right upper lobe nonetheless the patient was admitted with the impression of 

possible pneumonia and this consult was initiated.  Patient has been receiving 

antibiotics, bronchodilators, and she is feeling much better today compared to 

how she felt upon admission.  Patient is on Rocephin and Zithromax is also on 

DuoNeb updrafts 4 times a day and when necessary. there is no evidence of 

leukocytosis.  Her labs were basically unremarkable except for slightly elevated

d-dimer of 0.67, normal electrolytes, normal renal profile,normal troponin.  And

her pro calcitonin level is 0.03





Review of Systems





constitutional: Negative


HEENT: Negative


Cardiac: Negative


Pulmonary: As noted in HPI


Hematologic: Negative


GI: Negative


Genitourinary: Negative


 musculoskeletal: Negative


Psychiatric: Negative


 psych skin: Negative


Endocrine: Negative





Past Medical History


Past Medical History: Coronary Artery Disease (CAD), Cancer, COPD, GERD/Reflux, 

Hyperlipidemia, Osteoarthritis (OA), Pneumonia, Thyroid Disorder


Additional Past Medical History / Comment(s): stage IIB small cell lung cancer, 

bladder atony, had chemo last one 4/19/15, finished radiation 2015, 

upper right lobe of lung removed.


History of Any Multi-Drug Resistant Organisms: None Reported


Past Surgical History: Adenoidectomy, Cholecystectomy, Tonsillectomy


Additional Past Surgical History / Comment(s): cataract surgery, transbronchial 

biopsy, VATS, port A- cath, colonoscopy. has mediport on right side of chest-

REMOVED 2015, removal of RUL and removal of lymph nodes right side 

5/27/15. Cataract surgery


Past Anesthesia/Blood Transfusion Reactions: No Reported Reaction


Past Psychological History: No Psychological Hx Reported


Smoking Status: Former smoker


Past Alcohol Use History: Occasional


Additional Past Alcohol Use History / Comment(s): NO ALCHOHOL IN PAST YEAR


Past Drug Use History: None Reported





- Past Family History


  ** Mother


Family Medical History: Coronary Artery Disease (CAD), CVA/TIA, Myocardial 

Infarction (MI)


Additional Family Medical History / Comment(s): Mother  of heart attack at 

age 84





  ** Father


Family Medical History: Coronary Artery Disease (CAD), Myocardial Infarction 

(MI)


Additional Family Medical History / Comment(s): Father  of heart attack at 

age 86





  ** Brother(s)


Family Medical History: Myocardial Infarction (MI)


Additional Family Medical History / Comment(s): Brother  of heart attack at 

age 51





  ** Sister(s)


Family Medical History: Myocardial Infarction (MI)


Additional Family Medical History / Comment(s): Aunt with leukemia, maternal 

aunt with breast cancer





Medications and Allergies


                                Home Medications











 Medication  Instructions  Recorded  Confirmed  Type


 


Omeprazole [PriLOSEC] 20 mg PO BID 05/13/15 07/18/23 History


 


Estrogens, Conjugated [Premarin] 0.3 mg PO Q72H 11/23/15 07/18/23 History


 


Medroxyprogesterone Acetate 1.25 mg PO Q72H 11/23/15 07/18/23 History





[Provera]    


 


Clobetasol Propionate/Emoll 1 applic TOPICAL HS 06/15/20 07/18/23 History





[Clobetasol Emulsion 0.05% Foam]    


 


Levothyroxine Sodium [Synthroid] 50 mcg PO SUSA 23 History


 


Levothyroxine Sodium [Synthroid] 100 mcg PO MOTUWETHFR 23 History


 


Multivitamins, Thera [Multivitamin 1 tab PO DAILY 23 History





(formulary)]    


 


Rosuvastatin Calcium 5 mg PO DAILY 23 History








                                    Allergies











Allergy/AdvReac Type Severity Reaction Status Date / Time


 


No Known Allergies Allergy   Verified 23 15:21














Physical Exam


Vitals: 


                                   Vital Signs











  Temp Pulse Pulse Resp BP BP Pulse Ox


 


 23 14:00  98.0 F   82  16   129/78  100


 


 23 12:08   80     


 


 23 11:59   80     


 


 23 09:40     18   


 


 23 08:56   78     


 


 23 08:48   76      95


 


 23 07:42     18   


 


 23 07:16  96.9 F L   72  16   125/59  100


 


 23 01:00  97.9 F   89  18   111/57  98


 


 23 22:55   80     


 


 23 22:45   84     


 


 23 19:40  97.6 F   81  18   162/65  100


 


 23 17:31  98.0 F  73   18  159/88   100








                                Intake and Output











 23





 22:59 06:59 14:59


 


Other:   


 


  Voiding Method   Toilet


 


  # Voids  1 


 


  Weight  57.153 kg 














Physical Exam: Revealed 82-year-old female in no distress


Head: Atraumatic, normocephalic.


HEENT:[Neck is supple.] [No neck masses.] [No thyromegaly.] [No JVD.]


Chest: [Clear throughout, no crackles, no rhonchi, no wheezes.]


Cardiac Exam: [Normal S1 and S2, no S3 gallop, no murmur.]


Abdomen: [Soft, nontender,  no megaly, no rebound, no guarding, normal bowel 

sounds.]


Extremities: [No clubbing, no edema, no cyanosis.]


Neurological Exam: [No focal neurologic deficit.]


psychiatric: Normal mood affect and normal mental status examination.


Skin: No rashes.





Results





- Laboratory Findings


CBC and BMP: 


                                 23 04:51





                                 23 04:51


PT/INR, D-dimer











PT  10.2 sec (9.0-12.0)   23  13:52    


 


INR  1.0  (<1.2)   23  13:52    


 


D-Dimer  0.67 mg/L FEU (<0.60)  H  23  13:52    








Abnormal lab findings: 


                                  Abnormal Labs











  23





  13:52 13:52 04:51


 


Hct    36.4 L


 


D-Dimer  0.67 H  


 


Sodium   136 L 


 


Glucose   133 H 


 


AST   37 H 














- Diagnostic Findings


Chest x-ray: image reviewed (chest x-ray showed postsurgical changes in the 

right upper lobe area and extensive opacity in the right upper lung field)





Assessment and Plan


Assessment: 





impression:


Acute exacerbation of COPD, significantly improved since admission


Possible tracheobronchitis, doubt pneumonia a son that chest x-ray findings and 

the chronicity of the problem, reviewed CT of the chest from 2022.


Postsurgical changes in the right upper lobe.


history of stage IIB bronchogenic carcinoma and previous right upper lobectomy


History of underlying coronary artery disease, asymptomatic





Recommendation:


Suggested transitioning the patient to oral antibiotics, would recommend 

Levaquin 500 milligrams daily for 10 days


Suggest discharging the patient home on antibiotics and bronchodilators, and a 

short course of prednisone burst and taper


Patient to have follow-up with her pulmonologist on outpatient basis.


Cleared from my perspective for discharge planning patient would like to go home


, and requested to be discharged.


Will follow as needed


Time with Patient: Greater than 30

## 2023-07-19 NOTE — XR
EXAMINATION TYPE: XR chest 1V portable

 

DATE OF EXAM: 7/19/2023

 

Comparison: 7/18/2023

 

Clinical History: 82-year-old female pneumonia

 

Findings:

Heart normal size. Aorta and pulmonary vasculature within normal limits. Redemonstrated volume loss w
ithin the right hemithorax with extensive opacity right upper to midlung and architectural distortion
 of the right hilum. Surgical material is present.. Left lung and pleural space are relatively clear.


 

 

Impression:

Similar postsurgical changes on the right with volume loss and extensive opacity right upper lung.

## 2023-07-26 ENCOUNTER — HOSPITAL ENCOUNTER (OUTPATIENT)
Dept: HOSPITAL 47 - RADXRMAIN | Age: 83
Discharge: HOME | End: 2023-07-26
Attending: PHYSICIAN ASSISTANT
Payer: MEDICARE

## 2023-07-26 DIAGNOSIS — M50.30: Primary | ICD-10-CM

## 2023-07-26 DIAGNOSIS — M47.814: ICD-10-CM

## 2023-07-26 PROCEDURE — 72072 X-RAY EXAM THORAC SPINE 3VWS: CPT

## 2023-07-26 PROCEDURE — 72050 X-RAY EXAM NECK SPINE 4/5VWS: CPT

## 2023-07-26 NOTE — XR
EXAMINATION TYPE: XR thoracic spine complete

 

DATE OF EXAM: 7/26/2023 3:18 PM

 

INDICATION: 

Patient age:Female;  82 years old; 

Reason for study: M54.2 CERVICALGIA M54.9 DORSALGIA, UNSPECIFIED; [facility]. 

 

COMPARISON: 7/26/2023

 

TECHNIQUE: 2 views of the thoracic spine in Frontal and lateral projections. 

 

FINDINGS: 

Right upper lung postsurgical changes with consolidation could represent postsurgical/treatment persaud
e.

.

No evidence of acute fracture.  There is scattered multilevel disk space narrowing without loss of ve
rtebral body height. There is normal alignment of the thoracic vertebral bodies. Scattered osteophyte
 formation along the anterior and lateral aspects of the vertebral bodies. Neural foramen are patent 
given limitations of this exam. Spinal canal appears patent.

 

Right upper quadrant cholecystectomy clips.

 

IMPRESSION: 

No acute osseous pathology.

Mild to moderate multilevel degeneration changes throughout the thoracic spine.

## 2023-07-26 NOTE — XR
EXAMINATION TYPE: XR cervical spine comp

 

DATE OF EXAM: 7/26/2023 3:18 PM

 

INDICATION: 

Patient age:Female;  82 years old; 

Reason for study: M54.2 CERVICALGIA M54.9 DORSALGIA, UNSPECIFIED; [facility]. 

 

COMPARISON: None

 

TECHNIQUE: The cervical spine was imaged in frontal, lateral, odontoid and bilateral oblique.

 

FINDINGS:

The osseous structures show normal alignment without evidence of an acute fracture. There are osteoph
ytes noted throughout the cervical spine on the anterior and lateral aspects of the vertebral bodies.
 The intervertebral disk spaces are narrowed at multiple levels. Pedicles are intact.  Soft tissues a
re within normal limits. The odontoid appears intact.

 

IMPRESSION: 

1. No fracture or dislocation.

 

2. Mild to moderate degenerative disc disease changes of the cervical spine.

## 2023-09-07 ENCOUNTER — HOSPITAL ENCOUNTER (OUTPATIENT)
Dept: HOSPITAL 47 - RADBDWWP | Age: 83
Discharge: HOME | End: 2023-09-07
Attending: INTERNAL MEDICINE
Payer: MEDICARE

## 2023-09-07 DIAGNOSIS — Z78.0: ICD-10-CM

## 2023-09-07 DIAGNOSIS — M85.80: Primary | ICD-10-CM

## 2023-09-07 PROCEDURE — 77080 DXA BONE DENSITY AXIAL: CPT

## 2023-09-07 NOTE — BD
EXAMINATION TYPE: Axial Bone Density

 

DATE OF EXAM: 2023

 

CLINICAL HISTORY: 83 years old Female.  ICD-10 CODE: OSTEOPOROSIS M81.0

 

Height:  60

Weight:  127.3

 

FRAX RISK QUESTIONS:

Alcohol (3 or more units per day):  no

Family History (Parent hip fracture):  no

Glucocorticoids (More than 3mos):  no

           (Ex: prednisone, prednisolone, methylprednisolone, dexamethasone, and hydrocortisone).    
     

History of Fracture in Adulthood: no

Secondary Osteoporosis:

  1.  Type 1 Diabetes: no

  2.  Hyperthyroidism: no

  3.  Menopause before 45: no

  4.  Malnutrition: no

  5.  Chronic liver disease: no

Rheumatoid Arthritis: no

Current Tobacco Use: no

 

RISK FACTORS 

HISTORY OF: 

Surgery to Spine/Hip(right/left)/Wrist (right/left): no

Family History of Osteoporosis: yes

Active: no

Diet low in dairy products/other sources of calcium:  yes

Postmenopausal woman: yes

Take estrogen and/or progesterone medications: yes

How lon years

Lost more than 2 inches in height since high school: yes

 

MEDICATIONS: 

Thyroid Medications:  levothyroxine

How Lon years

 

 

Additional History:

 

 

EXAM MEASUREMENTS: 

Bone mineral densitometry was performed using the [employer].

Bone mineral density as measured about the Lumbar spine is:  

----- L1-L4(G/cm2): 1.164

T Score Values are as follows:

----- L1: .-0.2

----- L2: 0.0

----- L3: -0.2

----- L4: -0.2

----- L1-L4: -0.1

 

Z Score Values are as follows:

----- L1: 2.0

----- L2: 2.1

----- L3: 1.9

----- L4: 1.9

----- L1-L4: 2.0

 

Bone mineral density has: decreased -0.3 % since study of: 2018

 

Bone mineral density about the R hip (g/cm2): 0.920

Bone mineral density about the L hip (g/cm2): 0.965

T Score values are as follows:

-----R Neck: -1.4

-----L Neck: -0.8

-----R Total: -0.7

-----L Total: -0.3

 

Z Score values are as follows:

-----R Neck: 1.1

-----L Neck: 1.7

-----R Total: 1.7

-----L Total: 2.1

 

Bone mineral density has: decreased -5.2 % since study of: 2018

 

 

FRAX%s: The graph provided illustrates a 13.4% chance for a major osteoporotic fx and a 3.5% chance f
or the hips probability for fx in 10 years time.

 

 

 

 

IMPRESSION:

Osteopenia (T Score between -2.5 and -1).

 

There is slightly increased risk of fracture and the patient may be considered 

for treatment. 

 

Re-Screen 2-5 years.

 

NOTE:  T-SCORE=SD OF THE YOUNG ADULT MEAN.

## 2025-06-23 ENCOUNTER — HOSPITAL ENCOUNTER (OUTPATIENT)
Dept: HOSPITAL 47 - RADCTMAIN | Age: 85
Discharge: HOME | End: 2025-06-23
Attending: INTERNAL MEDICINE
Payer: MEDICARE

## 2025-06-23 DIAGNOSIS — C34.92: Primary | ICD-10-CM

## 2025-06-23 DIAGNOSIS — M77.30: ICD-10-CM

## 2025-06-23 LAB
BUN SERPL-SCNC: 17 MG/DL (ref 7–17)
CREATININE: 0.82 MG/DL (ref 0.52–1.04)

## 2025-06-23 PROCEDURE — 71260 CT THORAX DX C+: CPT

## 2025-06-23 PROCEDURE — 82565 ASSAY OF CREATININE: CPT

## 2025-06-23 PROCEDURE — 36415 COLL VENOUS BLD VENIPUNCTURE: CPT

## 2025-06-23 PROCEDURE — 84520 ASSAY OF UREA NITROGEN: CPT
